# Patient Record
Sex: MALE | Race: AMERICAN INDIAN OR ALASKA NATIVE | NOT HISPANIC OR LATINO
[De-identification: names, ages, dates, MRNs, and addresses within clinical notes are randomized per-mention and may not be internally consistent; named-entity substitution may affect disease eponyms.]

---

## 2017-03-29 ENCOUNTER — CHARTING TRANS (OUTPATIENT)
Dept: OTHER | Age: 27
End: 2017-03-29

## 2017-12-04 ENCOUNTER — BH HISTORICAL (OUTPATIENT)
Dept: OTHER | Age: 27
End: 2017-12-04

## 2018-06-04 ENCOUNTER — BH HISTORICAL (OUTPATIENT)
Dept: OTHER | Age: 28
End: 2018-06-04

## 2018-12-03 ENCOUNTER — BEHAVIORAL HEALTH (OUTPATIENT)
Dept: BEHAVIORAL HEALTH | Age: 28
End: 2018-12-03

## 2018-12-03 DIAGNOSIS — F25.9 SCHIZOAFFECTIVE DISORDER, UNSPECIFIED TYPE (CMD): Primary | ICD-10-CM

## 2018-12-03 PROCEDURE — 99214 OFFICE O/P EST MOD 30 MIN: CPT | Performed by: PSYCHIATRY & NEUROLOGY

## 2018-12-03 RX ORDER — ARIPIPRAZOLE 10 MG/1
10 TABLET ORAL DAILY
Qty: 90 TABLET | Refills: 1 | Status: SHIPPED | OUTPATIENT
Start: 2018-12-03 | End: 2019-06-04 | Stop reason: SDUPTHER

## 2019-06-04 ENCOUNTER — OFFICE VISIT (OUTPATIENT)
Dept: BEHAVIORAL HEALTH | Age: 29
End: 2019-06-04

## 2019-06-04 DIAGNOSIS — F25.9 SCHIZOAFFECTIVE DISORDER, UNSPECIFIED TYPE (CMD): Primary | ICD-10-CM

## 2019-06-04 PROCEDURE — 99213 OFFICE O/P EST LOW 20 MIN: CPT | Performed by: PSYCHIATRY & NEUROLOGY

## 2019-06-04 RX ORDER — ARIPIPRAZOLE 10 MG/1
10 TABLET ORAL DAILY
Qty: 90 TABLET | Refills: 1 | Status: SHIPPED | OUTPATIENT
Start: 2019-06-04 | End: 2019-12-03 | Stop reason: SDUPTHER

## 2019-12-03 ENCOUNTER — OFFICE VISIT (OUTPATIENT)
Dept: BEHAVIORAL HEALTH | Age: 29
End: 2019-12-03

## 2019-12-03 DIAGNOSIS — F25.9 SCHIZOAFFECTIVE DISORDER, UNSPECIFIED TYPE (CMD): Primary | ICD-10-CM

## 2019-12-03 PROCEDURE — 99213 OFFICE O/P EST LOW 20 MIN: CPT | Performed by: PSYCHIATRY & NEUROLOGY

## 2019-12-03 RX ORDER — ARIPIPRAZOLE 10 MG/1
10 TABLET ORAL DAILY
Qty: 90 TABLET | Refills: 1 | Status: SHIPPED | OUTPATIENT
Start: 2019-12-03 | End: 2020-06-02 | Stop reason: SDUPTHER

## 2020-06-01 ENCOUNTER — TELEPHONE (OUTPATIENT)
Dept: BEHAVIORAL HEALTH | Age: 30
End: 2020-06-01

## 2020-06-02 ENCOUNTER — BEHAVIORAL HEALTH (OUTPATIENT)
Dept: BEHAVIORAL HEALTH | Age: 30
End: 2020-06-02

## 2020-06-02 DIAGNOSIS — F25.0 SCHIZOAFFECTIVE DISORDER, BIPOLAR TYPE (CMD): Primary | ICD-10-CM

## 2020-06-02 PROCEDURE — 99213 OFFICE O/P EST LOW 20 MIN: CPT | Performed by: PSYCHIATRY & NEUROLOGY

## 2020-06-02 RX ORDER — ARIPIPRAZOLE 10 MG/1
10 TABLET ORAL DAILY
Qty: 90 TABLET | Refills: 1 | Status: SHIPPED | OUTPATIENT
Start: 2020-06-02 | End: 2020-12-02 | Stop reason: SDUPTHER

## 2020-11-11 ENCOUNTER — TELEPHONE (OUTPATIENT)
Dept: BEHAVIORAL HEALTH | Age: 30
End: 2020-11-11

## 2020-12-02 ENCOUNTER — BEHAVIORAL HEALTH (OUTPATIENT)
Dept: BEHAVIORAL HEALTH | Age: 30
End: 2020-12-02

## 2020-12-02 DIAGNOSIS — F25.9 SCHIZOAFFECTIVE DISORDER, UNSPECIFIED TYPE (CMD): Primary | ICD-10-CM

## 2020-12-02 PROCEDURE — 99213 OFFICE O/P EST LOW 20 MIN: CPT | Performed by: PSYCHIATRY & NEUROLOGY

## 2020-12-02 RX ORDER — ARIPIPRAZOLE 10 MG/1
10 TABLET ORAL DAILY
Qty: 30 TABLET | Refills: 5 | Status: SHIPPED | OUTPATIENT
Start: 2020-12-02 | End: 2021-06-02 | Stop reason: SDUPTHER

## 2021-06-02 ENCOUNTER — BEHAVIORAL HEALTH (OUTPATIENT)
Dept: BEHAVIORAL HEALTH | Age: 31
End: 2021-06-02

## 2021-06-02 DIAGNOSIS — F25.0 SCHIZOAFFECTIVE DISORDER, BIPOLAR TYPE (CMD): Primary | ICD-10-CM

## 2021-06-02 PROCEDURE — 99213 OFFICE O/P EST LOW 20 MIN: CPT | Performed by: PSYCHIATRY & NEUROLOGY

## 2021-06-02 RX ORDER — ARIPIPRAZOLE 10 MG/1
10 TABLET ORAL DAILY
Qty: 30 TABLET | Refills: 5 | Status: SHIPPED | OUTPATIENT
Start: 2021-06-02 | End: 2021-12-03 | Stop reason: SDUPTHER

## 2021-09-07 RX ORDER — ARIPIPRAZOLE 10 MG/1
10 TABLET ORAL DAILY
Qty: 30 TABLET | Refills: 5 | OUTPATIENT
Start: 2021-09-07

## 2021-12-03 ENCOUNTER — BEHAVIORAL HEALTH (OUTPATIENT)
Dept: BEHAVIORAL HEALTH | Age: 31
End: 2021-12-03

## 2021-12-03 DIAGNOSIS — F25.0 SCHIZOAFFECTIVE DISORDER, BIPOLAR TYPE (CMD): Primary | ICD-10-CM

## 2021-12-03 PROCEDURE — 99213 OFFICE O/P EST LOW 20 MIN: CPT | Performed by: PSYCHIATRY & NEUROLOGY

## 2021-12-03 RX ORDER — ARIPIPRAZOLE 10 MG/1
10 TABLET ORAL DAILY
Qty: 30 TABLET | Refills: 5 | Status: SHIPPED | OUTPATIENT
Start: 2021-12-03 | End: 2022-09-22 | Stop reason: DRUGHIGH

## 2022-03-30 ENCOUNTER — TELEPHONE (OUTPATIENT)
Dept: BEHAVIORAL HEALTH | Age: 32
End: 2022-03-30

## 2022-03-30 RX ORDER — ARIPIPRAZOLE 2 MG/1
2 TABLET ORAL DAILY
Qty: 30 TABLET | Refills: 1 | Status: SHIPPED | OUTPATIENT
Start: 2022-03-30 | End: 2022-07-06 | Stop reason: SDUPTHER

## 2022-06-02 ENCOUNTER — TELEPHONE (OUTPATIENT)
Dept: BEHAVIORAL HEALTH | Age: 32
End: 2022-06-02

## 2022-06-03 ENCOUNTER — APPOINTMENT (OUTPATIENT)
Dept: BEHAVIORAL HEALTH | Age: 32
End: 2022-06-03

## 2022-06-15 ENCOUNTER — OFFICE VISIT (OUTPATIENT)
Dept: FAMILY MEDICINE CLINIC | Facility: CLINIC | Age: 32
End: 2022-06-15
Payer: MEDICARE

## 2022-06-15 VITALS
WEIGHT: 191.31 LBS | RESPIRATION RATE: 12 BRPM | HEIGHT: 71 IN | HEART RATE: 85 BPM | SYSTOLIC BLOOD PRESSURE: 118 MMHG | OXYGEN SATURATION: 95 % | TEMPERATURE: 98 F | DIASTOLIC BLOOD PRESSURE: 76 MMHG | BODY MASS INDEX: 26.78 KG/M2

## 2022-06-15 DIAGNOSIS — F20.89 OTHER SCHIZOPHRENIA (HCC): ICD-10-CM

## 2022-06-15 DIAGNOSIS — R03.0 ELEVATED BLOOD-PRESSURE READING WITHOUT DIAGNOSIS OF HYPERTENSION: ICD-10-CM

## 2022-06-15 DIAGNOSIS — L03.211 FACIAL CELLULITIS: Primary | ICD-10-CM

## 2022-06-15 DIAGNOSIS — R19.7 DIARRHEA, UNSPECIFIED TYPE: ICD-10-CM

## 2022-06-15 PROCEDURE — 99204 OFFICE O/P NEW MOD 45 MIN: CPT | Performed by: FAMILY MEDICINE

## 2022-06-15 RX ORDER — LACTOBACILLUS ACIDOPH-L.BULGARICUS 1 MILLION CELL CHEWABLE TABLET 1MM CELL
1 TABLET,CHEWABLE ORAL 2 TIMES DAILY
COMMUNITY
Start: 2022-06-06 | End: 2022-07-18

## 2022-06-15 RX ORDER — AMOXICILLIN AND CLAVULANATE POTASSIUM 875; 125 MG/1; MG/1
TABLET, FILM COATED ORAL
COMMUNITY
Start: 2022-06-06

## 2022-06-15 RX ORDER — ARIPIPRAZOLE 10 MG/1
12 TABLET ORAL DAILY
COMMUNITY
Start: 2021-12-03

## 2022-06-15 RX ORDER — HYDROCODONE BITARTRATE AND ACETAMINOPHEN 5; 325 MG/1; MG/1
1 TABLET ORAL
COMMUNITY
Start: 2022-06-03

## 2022-06-15 RX ORDER — ARIPIPRAZOLE 2 MG/1
TABLET ORAL
COMMUNITY
Start: 2022-05-26

## 2022-06-17 ENCOUNTER — TELEPHONE (OUTPATIENT)
Dept: FAMILY MEDICINE CLINIC | Facility: CLINIC | Age: 32
End: 2022-06-17

## 2022-06-29 ENCOUNTER — OFFICE VISIT (OUTPATIENT)
Dept: FAMILY MEDICINE CLINIC | Facility: CLINIC | Age: 32
End: 2022-06-29
Payer: MEDICARE

## 2022-06-29 ENCOUNTER — TELEPHONE (OUTPATIENT)
Dept: FAMILY MEDICINE CLINIC | Facility: CLINIC | Age: 32
End: 2022-06-29

## 2022-06-29 VITALS
RESPIRATION RATE: 16 BRPM | OXYGEN SATURATION: 97 % | HEART RATE: 70 BPM | BODY MASS INDEX: 28.14 KG/M2 | SYSTOLIC BLOOD PRESSURE: 144 MMHG | DIASTOLIC BLOOD PRESSURE: 100 MMHG | TEMPERATURE: 99 F | HEIGHT: 71 IN | WEIGHT: 201 LBS

## 2022-06-29 DIAGNOSIS — R10.9 FLANK PAIN: Primary | ICD-10-CM

## 2022-06-29 DIAGNOSIS — R93.5 ABNORMAL FINDINGS ON DIAGNOSTIC IMAGING OF ABDOMEN: ICD-10-CM

## 2022-06-29 DIAGNOSIS — I10 ESSENTIAL HYPERTENSION, BENIGN: Primary | ICD-10-CM

## 2022-06-29 PROCEDURE — 99214 OFFICE O/P EST MOD 30 MIN: CPT | Performed by: FAMILY MEDICINE

## 2022-06-29 RX ORDER — LISINOPRIL 10 MG/1
10 TABLET ORAL DAILY
Qty: 30 TABLET | Refills: 2 | Status: SHIPPED | OUTPATIENT
Start: 2022-06-29 | End: 2022-09-27

## 2022-06-29 NOTE — TELEPHONE ENCOUNTER
Called and spoke with Audra Minaya home health RN, asked to do weekly BP checks and notify office, verbalized understanding. Called and spoke with pt, notified of new rx and directions, pt understands.

## 2022-06-29 NOTE — TELEPHONE ENCOUNTER
I discussed with patient and mother at appointment  We would start medications if home health was getting a higher reading  And this is  So   Start lisinopril 10 mg every day #90    Have home n\  Health dend his blood pressure readings once a week please    Sent to [employer]

## 2022-07-06 ENCOUNTER — TELEPHONE (OUTPATIENT)
Dept: FAMILY MEDICINE CLINIC | Facility: CLINIC | Age: 32
End: 2022-07-06

## 2022-07-06 RX ORDER — LISINOPRIL 20 MG/1
20 TABLET ORAL DAILY
Qty: 30 TABLET | Refills: 0 | Status: SHIPPED | OUTPATIENT
Start: 2022-07-06 | End: 2022-08-05

## 2022-07-06 NOTE — TELEPHONE ENCOUNTER
Increase to lisinopril to   20 mg  A day  Report blood pressure this next week when vis nurse records it

## 2022-07-06 NOTE — TELEPHONE ENCOUNTER
BP TODAY 150/90. HEART RATE IS 84. [id number] [facility], PT WENT TO [address] AND HE HAS KIDNEY STONE. VW STARTED HIM ON [address] [facility].

## 2022-07-07 RX ORDER — ARIPIPRAZOLE 2 MG/1
2 TABLET ORAL DAILY
Qty: 30 TABLET | Refills: 1 | Status: SHIPPED | OUTPATIENT
Start: 2022-07-07 | End: 2022-09-22 | Stop reason: DRUGHIGH

## 2022-07-12 ENCOUNTER — TELEPHONE (OUTPATIENT)
Dept: FAMILY MEDICINE CLINIC | Facility: CLINIC | Age: 32
End: 2022-07-12

## 2022-07-12 NOTE — TELEPHONE ENCOUNTER
Please record the external blood pressure  And  No change at this time  Have them send a summary of blood pressure in 2 weeks time

## 2022-07-12 NOTE — TELEPHONE ENCOUNTER
RN CALLING TO REPORT BLOOD PRESSURE FOR TODAY.  /88 HR 88  WAS PUT ON BP MEDICATION, ID ANY CHANGES PLEASE CALL

## 2022-07-25 ENCOUNTER — TELEPHONE (OUTPATIENT)
Dept: FAMILY MEDICINE CLINIC | Facility: CLINIC | Age: 32
End: 2022-07-25

## 2022-07-25 NOTE — TELEPHONE ENCOUNTER
Moshe Number with [facility] blood pressure today was 138/86 and heart rate was 83 this is the 2 week update.

## 2022-07-26 ENCOUNTER — TELEPHONE (OUTPATIENT)
Dept: FAMILY MEDICINE CLINIC | Facility: CLINIC | Age: 32
End: 2022-07-26

## 2022-08-18 ENCOUNTER — MED REC SCAN ONLY (OUTPATIENT)
Dept: FAMILY MEDICINE CLINIC | Facility: CLINIC | Age: 32
End: 2022-08-18

## 2022-09-01 ENCOUNTER — BEHAVIORAL HEALTH (OUTPATIENT)
Dept: BEHAVIORAL HEALTH | Age: 32
End: 2022-09-01

## 2022-09-01 ENCOUNTER — TELEPHONE (OUTPATIENT)
Dept: BEHAVIORAL HEALTH | Age: 32
End: 2022-09-01

## 2022-09-01 ENCOUNTER — TELEPHONE (OUTPATIENT)
Dept: FAMILY MEDICINE CLINIC | Facility: CLINIC | Age: 32
End: 2022-09-01

## 2022-09-01 DIAGNOSIS — F25.0 SCHIZOAFFECTIVE DISORDER, BIPOLAR TYPE (CMD): Primary | ICD-10-CM

## 2022-09-01 PROCEDURE — 90833 PSYTX W PT W E/M 30 MIN: CPT | Performed by: PSYCHIATRY & NEUROLOGY

## 2022-09-01 PROCEDURE — 99214 OFFICE O/P EST MOD 30 MIN: CPT | Performed by: PSYCHIATRY & NEUROLOGY

## 2022-09-01 NOTE — TELEPHONE ENCOUNTER
Spoke with Heidi who states patient had a psych appointment this morning, and the doctor recommended he be admitted to [facility] for evaluation. Will forward to Dr. Adrián Walker for review upon his return.

## 2022-09-01 NOTE — TELEPHONE ENCOUNTER
FYI:  HE IS BEING ADMITTED TO [facility] FOR IVONNE PER [facility]

## 2022-09-02 NOTE — ED NOTES
Writer introduced self/role to pt. Pt was calm and cooperative. Sitter present. Writer explained and offered unit resources which pt declined at this time. Pt advised to notify staff if in need of anything.

## 2022-09-02 NOTE — ED NOTES
Received a call from [facility] stating that they will not have an appropriate bed available.

## 2022-09-02 NOTE — ED NOTES
Received a call back from [facility]. They are unable to accommodate due to the acuity of the unit.

## 2022-09-02 NOTE — ED NOTES
Spoke with Shoshana who stated that transportation has been arranged for 6:30PM.    Called Navid Garcia and spoke with Hao Joe to inform her when transportation was arranged so that arrival time can be updated.

## 2022-09-02 NOTE — ED NOTES
Received a call back from [employer], Lillie Zacarias. Plan of care was discussed during phone call. Lillie Zacarias will try to fax over the [address] [facility] paperwork. Lillie Zacarias has asked that placement not be in the city as it will be difficult for her to get to get there after discharge or if anything is needed. [employer] and spoke with Nate Thomas to inquire about referral status. Nate Thomas stated that Referral Packet is still under review. Called Navid Baptiste and spoke with [facility]. Referral was made over the phone. Referral Packet has been faxed for review.

## 2022-09-02 NOTE — ED NOTES
Called Navid Truong and was informed that Nurse is requesting Andrew Danforth to receive potassium and a repeat potassium draw. After this has been completed, RN to RN can be completed. Spoke with ED Nurse who stated that she spoke with Vidal Maier as well.  She will be able to complete the potassium draw at 3;30PM.

## 2022-09-02 NOTE — ED QUICK NOTES
Patient up multiple times to bathroom throughout day, gait steady, patient cooperative and appropriate

## 2022-09-02 NOTE — ED NOTES
[facility] and referral was made over the phone. Referral Packet has been faxed for review.

## 2022-09-02 NOTE — ED PROVIDER NOTES
Patient remained quite stable here.   Laboratory work did show hypokalemia  Receiving facility requested replacement before transfer and repeat laboratory work to ensure adequate supplementation  20 mEq potassium ordered IV    Patient is in stable condition for inpatient psychiatric treatment    Case signed out to my colleague at end of shift to make final disposition

## 2022-09-02 NOTE — ED NOTES
Spoke with Marietta Chaney at [employer] who stated that Referral Packet is still being reviewed by the Nurse.

## 2022-09-02 NOTE — ED INITIAL ASSESSMENT (HPI)
32YM c/c of chris TEMPLE Py state that he been here voices and seeing visions that are calling out for help Pt state that he was referred to [facility] for admission

## 2022-09-02 NOTE — ED NOTES
Spoke with Tete Mendoza at [employer] to let her know that repeat potassium lab will be drawn at 3:30PM.

## 2022-09-02 NOTE — ED NOTES
Called Navid Garcia to follow-up with referral status. Unable to reach Intake as phone kept ringing. Will call back.

## 2022-09-02 NOTE — ED NOTES
Called Nichole and spoke with [facility] who stated that there are no acute beds. Navjot Zimmerman and spoke with America Yang to make referral over the phone. Referral Packet has been faxed for review.

## 2022-09-02 NOTE — ED NOTES
Pt signed in. Pt's volunteer rights were explained to him. A copy of [employer] and Rights were scanned into the charts.

## 2022-09-02 NOTE — TELEPHONE ENCOUNTER
[facility] does not have a bed open right now. Pt is in the ER until they can find a place for him. Rebecca West will call with updates as she receives it.

## 2022-09-02 NOTE — ED NOTES
Spoke with Tina Bamberger Cousin/TIESHA. Dilia Mandujano stated that she was at work and would probably not be able to fax over the [facility] paperwork today. If Dania Mancilla is not able to accept, Dilia Mandujano was asking that referral be made to [facility] and then [facility] due to distance.

## 2022-09-02 NOTE — ED NOTES
Spoke with Cherylene Bloodgood to inform him that we are looking at in-patient placement, and that Rhiannon Esparza is currently reviewing. Informed Cherylene Bloodgood that his Dyana Nunez would also be contacted as well. Cherylene Bloodgood was agreeable for Shmuel Del Rosario to be contacted. Called Shmuel Del Rosario, Guillaume's cousin and [place]. Left a general voice mail message for a returned call.  When Shmuel Del Rosario calls back, will discuss plan of care and ask for POA paperwork to be sent as well,
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
## 2022-09-13 ENCOUNTER — MED REC SCAN ONLY (OUTPATIENT)
Dept: FAMILY MEDICINE CLINIC | Facility: CLINIC | Age: 32
End: 2022-09-13

## 2022-09-13 ENCOUNTER — TELEPHONE (OUTPATIENT)
Dept: BEHAVIORAL HEALTH | Age: 32
End: 2022-09-13

## 2022-09-21 ENCOUNTER — TELEPHONE (OUTPATIENT)
Dept: FAMILY MEDICINE CLINIC | Facility: CLINIC | Age: 32
End: 2022-09-21

## 2022-09-21 NOTE — TELEPHONE ENCOUNTER
Isaac Morales is calling Micheline Hodgson was admitted to [facility] in [facility] 9/1 to 9/18 while in the hospital they put him on clonazepam .05 mg 1 tab by mouth at bed time for anxiety but Rajendraly Andrew thinks he is having side effects from this, taryn confusion cant move his mouth well.   Please call    Needs a refill on his blood pressure medication, did not know the name of it  please call into [facility] in [place]

## 2022-09-21 NOTE — TELEPHONE ENCOUNTER
Spoke briefly to Mary (pt's cousin) and advised she was not listed on Guillaume's release of information form and therefore could not discuss any medical information with her. Mary informed this RN that she was the medical POA, but was unable to find paperwork in pt's chart. Requested to speak to Luiza Valiente directly, Mary got upset and said, Shadi Rock you serious? Toni Lipoma Toni Lipoma Toni Lipoma Forget it. \" and hung up on this RN.

## 2022-09-21 NOTE — TELEPHONE ENCOUNTER
When I called back to speak with Isabela Suarez re: POA paper work to let her know we did not have a copy on file and would need that paper work for the nurse to speak with her. I spoke with Zonia Stoddard and she said they are both POA for [place] and she would get the paper work to [facility] today or tomorrow but in conversation she mentioned they took Guillaume off a medication. She did not say which medication.  ELI

## 2022-09-22 ENCOUNTER — BEHAVIORAL HEALTH (OUTPATIENT)
Dept: BEHAVIORAL HEALTH | Age: 32
End: 2022-09-22

## 2022-09-22 DIAGNOSIS — F25.0 SCHIZOAFFECTIVE DISORDER, BIPOLAR TYPE (CMD): Primary | ICD-10-CM

## 2022-09-22 PROCEDURE — 99214 OFFICE O/P EST MOD 30 MIN: CPT | Performed by: PSYCHIATRY & NEUROLOGY

## 2022-09-22 RX ORDER — ARIPIPRAZOLE 15 MG/1
15 TABLET ORAL DAILY
Qty: 30 TABLET | Refills: 2 | Status: SHIPPED | OUTPATIENT
Start: 2022-09-22

## 2022-10-20 ENCOUNTER — TELEPHONE (OUTPATIENT)
Dept: FAMILY MEDICINE CLINIC | Facility: CLINIC | Age: 32
End: 2022-10-20

## 2022-10-20 ENCOUNTER — TELEPHONE (OUTPATIENT)
Dept: BEHAVIORAL HEALTH | Age: 32
End: 2022-10-20

## 2022-10-20 NOTE — TELEPHONE ENCOUNTER
PT WAS SEEN @ [facility]/ [facility], HE IS NOW BEING ADMITTED TO [address] [facility] IN [place], [place], WANTS  TO BE AWARE, CALL SENAIT IF QUESTIONS

## 2022-11-28 ENCOUNTER — TELEPHONE (OUTPATIENT)
Dept: BEHAVIORAL HEALTH | Age: 32
End: 2022-11-28

## 2022-11-28 ENCOUNTER — APPOINTMENT (OUTPATIENT)
Dept: BEHAVIORAL HEALTH | Age: 32
End: 2022-11-28

## 2023-01-25 ENCOUNTER — PATIENT OUTREACH (OUTPATIENT)
Dept: FAMILY MEDICINE CLINIC | Facility: CLINIC | Age: 33
End: 2023-01-25

## 2023-03-17 ENCOUNTER — TELEPHONE (OUTPATIENT)
Dept: FAMILY MEDICINE CLINIC | Facility: CLINIC | Age: 33
End: 2023-03-17

## 2024-01-18 ENCOUNTER — PATIENT OUTREACH (OUTPATIENT)
Dept: FAMILY MEDICINE CLINIC | Facility: CLINIC | Age: 34
End: 2024-01-18

## 2024-04-23 ENCOUNTER — TELEPHONE (OUTPATIENT)
Dept: FAMILY MEDICINE CLINIC | Facility: CLINIC | Age: 34
End: 2024-04-23

## 2025-01-21 ENCOUNTER — TELEPHONE (OUTPATIENT)
Dept: FAMILY MEDICINE CLINIC | Facility: CLINIC | Age: 35
End: 2025-01-21